# Patient Record
Sex: MALE | Race: WHITE | Employment: FULL TIME | URBAN - METROPOLITAN AREA
[De-identification: names, ages, dates, MRNs, and addresses within clinical notes are randomized per-mention and may not be internally consistent; named-entity substitution may affect disease eponyms.]

---

## 2017-04-20 ENCOUNTER — OFFICE VISIT (OUTPATIENT)
Dept: INTERNAL MEDICINE | Facility: CLINIC | Age: 57
End: 2017-04-20

## 2017-04-20 VITALS
SYSTOLIC BLOOD PRESSURE: 138 MMHG | DIASTOLIC BLOOD PRESSURE: 88 MMHG | WEIGHT: 188 LBS | HEIGHT: 70 IN | BODY MASS INDEX: 26.92 KG/M2

## 2017-04-20 DIAGNOSIS — F52.21 ED (ERECTILE DYSFUNCTION) OF NON-ORGANIC ORIGIN: ICD-10-CM

## 2017-04-20 DIAGNOSIS — J30.89 PERENNIAL ALLERGIC RHINITIS, UNSPECIFIED ALLERGIC RHINITIS TRIGGER: ICD-10-CM

## 2017-04-20 DIAGNOSIS — L82.1 SEBORRHEIC KERATOSIS: ICD-10-CM

## 2017-04-20 DIAGNOSIS — Z00.00 ROUTINE HEALTH MAINTENANCE: ICD-10-CM

## 2017-04-20 DIAGNOSIS — R05.9 COUGH: ICD-10-CM

## 2017-04-20 DIAGNOSIS — I10 ESSENTIAL HYPERTENSION: ICD-10-CM

## 2017-04-20 DIAGNOSIS — R73.01 IMPAIRED FASTING GLUCOSE: Primary | ICD-10-CM

## 2017-04-20 LAB — HBA1C MFR BLD: 5.4 %

## 2017-04-20 PROCEDURE — 99215 OFFICE O/P EST HI 40 MIN: CPT | Performed by: INTERNAL MEDICINE

## 2017-04-20 PROCEDURE — 83036 HEMOGLOBIN GLYCOSYLATED A1C: CPT | Performed by: INTERNAL MEDICINE

## 2017-04-20 RX ORDER — CETIRIZINE HYDROCHLORIDE 10 MG/1
10 TABLET ORAL DAILY
COMMUNITY

## 2017-04-20 RX ORDER — FLUTICASONE PROPIONATE 50 MCG
2 SPRAY, SUSPENSION (ML) NASAL DAILY
COMMUNITY

## 2017-04-20 RX ORDER — TADALAFIL 10 MG/1
10 TABLET ORAL
Qty: 10 TABLET | Refills: 0 | Status: SHIPPED | OUTPATIENT
Start: 2017-04-20 | End: 2017-04-20 | Stop reason: SDUPTHER

## 2017-04-20 RX ORDER — TADALAFIL 10 MG/1
10 TABLET ORAL
Qty: 10 TABLET | Refills: 5 | Status: SHIPPED | OUTPATIENT
Start: 2017-04-20

## 2017-04-20 NOTE — PROGRESS NOTES
"Chief Complaint   Patient presents with   • Follow-up     Impaired fasting glucose       History of Present Illness  57 y.o.  gentleman presents after >2-yr absence for follow-up re: possible allergies/asthma as well as cialis RF.  Notes using 10mg tabs and cutting them in half, but they crumble.  Reports efficacy at 5mg without s/e.    Has moved to [place] but still comes to [facility] once in awhile.  Notes nasal congestion, hoarseness, and cough improved after move to [place], but still very symptomatic here in [place], and exacerbated when talking a lot.  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  He states Zyrtec works in [place] but not in [facility].  Friend has similar sxs and recommended addition of flonase.  Cough is mostly dry with very slight sm amounts of mucus able to be produced.    Patient reports wheezing constantly while here in [facility]; asx while in [place].  Denies SOB.  Wheezing starts as soon as he steps out of shower in the morning.    Review of Systems  ROS (+) for cough, mostly nonproductive, sinus congestion, hoarseness.  ROS (+) for still running 5d/week for exercise.  ROS (+) for wheezing while in [place] but without SOB or difficulty in breathing.  Notes sxs exacerbated when talking.    ROS (+) for skin spot at right sideburn area that needs to be evaluated.  Notes h/o skin CA s/p Moh's.   All other ROS reviewed and negative.    [facility]  The following portions of the patient's history were reviewed and updated as appropriate: allergies, current medications, past family history, past medical history, past social history, past surgical history and problem list.  SH: now lives in [place], [place];  to Scarlett, has 4 kids    MEDS:  1. Zyrtec 10mg QD  2. Cialis 10mg 1/2 prn    VITALS:  /88  Ht 70\" (177.8 cm)  Wt 188 lb (85.3 kg)  BMI 26.98 kg/m2    Physical Exam   Constitutional: He is oriented to person, place, and time. He appears well-developed and " well-nourished.   HENT:   Mouth/Throat: No oropharyngeal exudate (posterior drainage without exudate or erythema).   Nasal mucosa severely swollen bilaterally L>R without erythema     Eyes: Conjunctivae and EOM are normal.   Cardiovascular: Normal rate, regular rhythm and normal heart sounds.    Pulmonary/Chest: Effort normal and breath sounds normal. No respiratory distress. He has no wheezes. He has no rales.   Abdominal: Soft. Bowel sounds are normal.   Lymphadenopathy:     He has no cervical adenopathy.   Neurological: He is alert and oriented to person, place, and time.   Skin:   RIGHT preauricular area with 3mm pinkish-tan hyperkeratotic papule, sugg of SK   Psychiatric: He has a normal mood and affect. His behavior is normal.   Nursing note and vitals reviewed.      LABS  Results for orders placed or performed in visit on 04/20/17   POC Glycosylated Hemoglobin (Hb A1C)   Result Value Ref Range    Hemoglobin A1C 5.4 %       ASSESSMENT/PLAN  Problem List Items Addressed This Visit     ED (erectile dysfunction) of non-organic origin     Reports success with cialis 10mg 1/2 q72 prn #10, 0RF and #10, 5RF (latter to take back to pharmacy in [place]); discussed risks, dosing, and how to take med correctly         Relevant Medications    tadalafil (CIALIS) 10 MG tablet    Hypertension     BP borderline; needs some monitoring; already with regular aerobic exercise (runs 5d/week); low Na diet; f/u again at next visit with goal < 130/85         Relevant Orders    Comprehensive Metabolic Panel    Urinalysis With Microscopic    Microalbumin / Creatinine Urine Ratio    Impaired fasting glucose - Primary     BG control remains good with A1C 5.4; encouraged reg phys activity to decr insulin resistance, moderation in unhealthy starches/sweets; f/u A1C in 6 mos           Relevant Orders    POC Glycosylated Hemoglobin (Hb A1C) (Completed)    Hemoglobin A1c    Cough     Attributed to PND; cont zyrtec and will add flonase; if  remains symptomatic, check PFTs to eval bronchospasm/asthmatic component of sxs         Allergic rhinitis     Reviewed s/sxs as well as treatment recommendations; resume zyrtec and take perennially and add nasal saline spray as wellas steroid spray such as flonase - reviewed how to use correctly; discussed only doing allergy testing if interested in allergy shots         Seborrheic keratosis     RIGHT preauricular papule likely SK but he will f/u with derm; advised that he can call Dr. [place]'s office directly himself to get appt           Other Visit Diagnoses     Routine health maintenance        Relevant Orders    CBC & Differential    Lipid Panel    TSH    PSA        Time Documentation  Counseled patient  Counseling topics: diagnosis, treatment options and follow up plan  Total encounter time: counseling time more than 50% of visit: 40 minutes minutes    FOLLOW-UP  RTC for updated PE; fasting labs the week prior to appt (CBC, CMP, TSH, lipids, UA/micro, PSA, microalb, A1C) - lab orders given to patient to do in [place], [place]      Electronically signed by:    Татьяна Keating MD  04/20/2017

## 2017-04-20 NOTE — ASSESSMENT & PLAN NOTE
Reports success with cialis 10mg 1/2 q72 prn #10, 0RF and #10, 5RF (latter to take back to pharmacy in [place]); discussed risks, dosing, and how to take med correctly

## 2017-04-20 NOTE — ASSESSMENT & PLAN NOTE
Attributed to PND; cont zyrtec and will add flonase; if remains symptomatic, check PFTs to eval bronchospasm/asthmatic component of sxs

## 2017-04-20 NOTE — ASSESSMENT & PLAN NOTE
Reviewed s/sxs as well as treatment recommendations; resume zyrtec and take perennially and add nasal saline spray as wellas steroid spray such as flonase - reviewed how to use correctly; discussed only doing allergy testing if interested in allergy shots

## 2017-04-20 NOTE — ASSESSMENT & PLAN NOTE
BP borderline; needs some monitoring; already with regular aerobic exercise (runs 5d/week); low Na diet; f/u again at next visit with goal < 130/85

## 2017-04-20 NOTE — ASSESSMENT & PLAN NOTE
RIGHT preauricular papule likely SK but he will f/u with derm; advised that he can call Dr. [place]'s office directly himself to get appt